# Patient Record
Sex: MALE | Race: WHITE | NOT HISPANIC OR LATINO | Employment: FULL TIME | URBAN - METROPOLITAN AREA
[De-identification: names, ages, dates, MRNs, and addresses within clinical notes are randomized per-mention and may not be internally consistent; named-entity substitution may affect disease eponyms.]

---

## 2017-03-29 ENCOUNTER — APPOINTMENT (OUTPATIENT)
Dept: LAB | Facility: MEDICAL CENTER | Age: 42
End: 2017-03-29

## 2017-03-29 ENCOUNTER — APPOINTMENT (OUTPATIENT)
Dept: URGENT CARE | Facility: MEDICAL CENTER | Age: 42
End: 2017-03-29

## 2017-03-29 ENCOUNTER — TRANSCRIBE ORDERS (OUTPATIENT)
Dept: URGENT CARE | Facility: MEDICAL CENTER | Age: 42
End: 2017-03-29

## 2017-03-29 DIAGNOSIS — Z02.1 PHYSICAL EXAM, PRE-EMPLOYMENT: Primary | ICD-10-CM

## 2017-03-29 DIAGNOSIS — Z02.1 PHYSICAL EXAM, PRE-EMPLOYMENT: ICD-10-CM

## 2017-03-29 PROCEDURE — 36415 COLL VENOUS BLD VENIPUNCTURE: CPT

## 2017-03-29 PROCEDURE — 86480 TB TEST CELL IMMUN MEASURE: CPT

## 2017-03-31 LAB
ANNOTATION COMMENT IMP: NORMAL
GAMMA INTERFERON BACKGROUND BLD IA-ACNC: 0.04 IU/ML
M TB IFN-G BLD-IMP: NEGATIVE
M TB IFN-G CD4+ BCKGRND COR BLD-ACNC: 0.07 IU/ML
M TB IFN-G CD4+ T-CELLS BLD-ACNC: 0.11 IU/ML
MITOGEN IGNF BLD-ACNC: 9.85 IU/ML
QUANTIFERON-TB GOLD IN TUBE: NORMAL
SERVICE CMNT-IMP: NORMAL

## 2022-10-11 ENCOUNTER — TRANSCRIBE ORDERS (OUTPATIENT)
Dept: SCHEDULING | Age: 47
End: 2022-10-11

## 2022-10-11 DIAGNOSIS — R31.29 OTHER MICROSCOPIC HEMATURIA: ICD-10-CM

## 2022-10-11 DIAGNOSIS — N20.0 CALCULUS OF KIDNEY: Primary | ICD-10-CM

## 2022-10-21 ENCOUNTER — HOSPITAL ENCOUNTER (OUTPATIENT)
Dept: RADIOLOGY | Age: 47
Discharge: HOME | End: 2022-10-21
Attending: UROLOGY
Payer: COMMERCIAL

## 2022-10-21 DIAGNOSIS — N20.0 CALCULUS OF KIDNEY: ICD-10-CM

## 2022-10-21 DIAGNOSIS — R31.29 OTHER MICROSCOPIC HEMATURIA: ICD-10-CM

## 2022-10-21 PROCEDURE — 76770 US EXAM ABDO BACK WALL COMP: CPT

## 2024-10-17 ENCOUNTER — TELEPHONE (OUTPATIENT)
Dept: SCHEDULING | Facility: CLINIC | Age: 49
End: 2024-10-17
Payer: COMMERCIAL

## 2024-10-17 NOTE — TELEPHONE ENCOUNTER
New Patient Appointment Request    Name of caller: cullen    Reason for Visit: CT score results    Insurance: pt changed plans needs to call back to provide new info    Insurance ID #:     Recent Cardiac Test/Procedures: CT calcium score     Referred by: PCP    Primary Care Physician: Dr Anthony    Previous Cardiologist name and phone number: none    Best contact number: [phone]     Additional notes/History:

## 2024-10-17 NOTE — TELEPHONE ENCOUNTER
Dr. Anthony referrals are generally for my [place] office as they are closer to there. Liz, nonurgent outpatient consult, fine for it to me next available in January or February.

## 2024-11-11 ENCOUNTER — OFFICE VISIT (OUTPATIENT)
Dept: CARDIOLOGY | Facility: CLINIC | Age: 49
End: 2024-11-11
Payer: COMMERCIAL

## 2024-11-11 VITALS
WEIGHT: 179 LBS | HEART RATE: 74 BPM | DIASTOLIC BLOOD PRESSURE: 80 MMHG | SYSTOLIC BLOOD PRESSURE: 130 MMHG | BODY MASS INDEX: 27.13 KG/M2 | OXYGEN SATURATION: 99 % | HEIGHT: 68 IN

## 2024-11-11 DIAGNOSIS — R93.1 ELEVATED CORONARY ARTERY CALCIUM SCORE: Primary | ICD-10-CM

## 2024-11-11 DIAGNOSIS — R03.0 ELEVATED BLOOD-PRESSURE READING WITHOUT DIAGNOSIS OF HYPERTENSION: ICD-10-CM

## 2024-11-11 LAB
ATRIAL RATE: 73
P AXIS: -6
PR INTERVAL: 146
QRS DURATION: 78
QT INTERVAL: 362
QTC CALCULATION(BAZETT): 398
R AXIS: 49
T WAVE AXIS: 29
VENTRICULAR RATE: 73

## 2024-11-11 PROCEDURE — 3008F BODY MASS INDEX DOCD: CPT | Performed by: STUDENT IN AN ORGANIZED HEALTH CARE EDUCATION/TRAINING PROGRAM

## 2024-11-11 PROCEDURE — 99214 OFFICE O/P EST MOD 30 MIN: CPT | Performed by: STUDENT IN AN ORGANIZED HEALTH CARE EDUCATION/TRAINING PROGRAM

## 2024-11-11 PROCEDURE — 93000 ELECTROCARDIOGRAM COMPLETE: CPT | Performed by: STUDENT IN AN ORGANIZED HEALTH CARE EDUCATION/TRAINING PROGRAM

## 2024-11-11 RX ORDER — ATENOLOL 25 MG/1
25 TABLET ORAL DAILY
COMMUNITY

## 2024-11-11 RX ORDER — ASPIRIN 81 MG/1
81 TABLET ORAL DAILY
COMMUNITY

## 2024-11-11 RX ORDER — LATANOPROST 50 UG/ML
1 SOLUTION/ DROPS OPHTHALMIC NIGHTLY
COMMUNITY
Start: 2024-09-30

## 2024-11-11 RX ORDER — ALPRAZOLAM 0.25 MG/1
0.25 TABLET ORAL 2 TIMES DAILY
COMMUNITY
Start: 2024-10-22

## 2024-11-11 RX ORDER — ACETAMINOPHEN 500 MG
125 TABLET ORAL
COMMUNITY

## 2024-11-11 RX ORDER — MELOXICAM 7.5 MG/1
7.5 TABLET ORAL AS NEEDED
COMMUNITY
Start: 2024-09-12

## 2024-11-11 RX ORDER — ROSUVASTATIN CALCIUM 10 MG/1
10 TABLET, COATED ORAL DAILY
COMMUNITY
Start: 2024-10-18

## 2024-11-11 RX ORDER — SILDENAFIL CITRATE 20 MG/1
20 TABLET ORAL AS NEEDED
COMMUNITY
Start: 2024-11-07

## 2024-11-11 RX ORDER — MV/FA/DHA/EPA/FISH OIL/SAW/GNK 400MCG-200
500 COMBINATION PACKAGE (EA) ORAL DAILY
COMMUNITY

## 2024-11-11 ASSESSMENT — ENCOUNTER SYMPTOMS
AGITATION: 0
HALLUCINATIONS: 0
STRIDOR: 0
FATIGUE: 0
ABDOMINAL PAIN: 0
DIAPHORESIS: 0
SHORTNESS OF BREATH: 0
DYSURIA: 0
PALPITATIONS: 0
JOINT SWELLING: 0
LIGHT-HEADEDNESS: 0
MYALGIAS: 0
COLOR CHANGE: 0
VOMITING: 0
EYE DISCHARGE: 0
HEMATURIA: 0
EYE REDNESS: 0
FACIAL SWELLING: 0

## 2024-11-11 NOTE — PROGRESS NOTES
Cm Sullivan MD, Located within [facility]  Non-invasive Cardiology    [facility]  [facility]  [address]  [address]  [place] [address]    [facility] [place]  [address] [place] [address]     [phone]    [facility].[employer]     2024        Patient Name: Evelio Carlos  Account:          [id number]  :               1975        Dear Minnie Anthony MD:     I had the pleasure of seeing your patient, Evelio Carlos, on 2024. As you know, he is a 48 y.o. male with medical history as described below.     HPI  48 y.o. male with an elevated CAC (25) who presents for cardiovascular evaluation.  At today's visit the patient reports feeling well.   He states that his PCP recently ordered a coronary calcium score for him and was noted to be elevated and he was subsequently referred to cardiology for further evaluation.  In discussion with the patient he denies any history of chest pains, shortness of breath, or other anginal equivalents either at rest or with exertion.  He reports exercising regularly, going to the gym and doing both aerobic exercise as well as weight lifting.  He feels well doing so and never has any cardiovascular symptomatology.  He reports a prior history of tobacco use, quit about 10 years ago, and smoked for about 20 years, approximately half a pack a day.  He denies any history of heavy alcohol use or illicit drug use.  He reports a family history of heart failure in his father.      CARDIOVASCULAR STUDIES:    ECG: Independently reviewed: Normal sinus rhythm, normal ECG     CORONARY CALCIUM SCAN:    CT Calcium Score 10/15/2024  CORONARY CALCIUM SCORE:     Vessel    Number of                 Calcium volume     Calcium Score                    Calcifications                                             ([place])   =======================================================   LM:          0                  0                 0   LAD:       1 14 25   LCX:       0                 0                0   RCA:       0                  0                0   =======================================================   Total:        1 14            25          History reviewed. No pertinent past medical history.     Past Surgical History   Procedure Laterality Date    Wrist surgery Bilateral         Family History   Problem Relation Name Age of Onset    Rheum arthritis Biological Mother      Osteoporosis Biological Mother      Heart disease Biological Father      Diabetes Biological Father      Heart failure Biological Father      No Known Problems Maternal Grandmother      No Known Problems Maternal Grandfather      Dementia Paternal Grandmother      Heart attack Paternal Grandfather      Heart disease Paternal Grandfather      Diabetes Paternal Grandfather          Social History     Socioeconomic History    Marital status:      Spouse name: None    Number of children: None    Years of education: None    Highest education level: None   Tobacco Use    Smoking status: Former     Types: Cigarettes     Start date: 2012    Smokeless tobacco: Never   Vaping Use    Vaping status: Never Used   Substance and Sexual Activity    Alcohol use: Yes     Alcohol/week: 4.0 - 5.0 standard drinks of alcohol     Types: 4 - 5 Standard drinks or equivalent per week     Comment: social    Drug use: Never    Sexual activity: Defer        Current Outpatient Medications   Medication Sig Dispense Refill    ALPRAZolam (XANAX) 0.25 mg tablet Take 0.25 mg by mouth 2 (two) times a day.      aspirin 81 mg enteric coated tablet Take 81 mg by mouth daily.      atenoloL (TENORMIN) 25 mg tablet Take 25 mg by mouth daily.      cholecalciferol, vitamin D3, 5,000 unit (125 mcg) tablet Take 125 mcg by mouth 4 (four) times a week (Mon, Wed, Fri, Sat).      krill oil 500 mg capsule Take 500 mg by mouth daily.       "latanoprost (XALATAN) 0.005 % ophthalmic solution Administer 1 drop into both eyes nightly.      meloxicam (MOBIC) 7.5 mg tablet Take 7.5 mg by mouth as needed.      multivit-min/ferrous fumarate (MULTI VITAMIN ORAL) Take 1 tablet by mouth daily.      rosuvastatin (CRESTOR) 10 mg tablet Take 10 mg by mouth daily.      saw/Pygeum/beta/herb/D3/B6/Zn (PROSTATE HEALTH MEN'S ORAL) Take 1 tablet by mouth daily.      sildenafiL, pulm.hypertension, (REVATIO) 20 mg tablet Take 20 mg by mouth as needed.       No current facility-administered medications for this visit.        Allergies:  Patient has no known allergies.       Review of Systems   Constitutional:  Negative for diaphoresis and fatigue.   HENT:  Negative for facial swelling and nosebleeds.    Eyes:  Negative for discharge and redness.   Respiratory:  Negative for shortness of breath and stridor.    Cardiovascular:  Negative for chest pain and palpitations.   Gastrointestinal:  Negative for abdominal pain and vomiting.   Genitourinary:  Negative for dysuria and hematuria.   Musculoskeletal:  Negative for joint swelling and myalgias.   Skin:  Negative for color change and rash.   Neurological:  Negative for syncope and light-headedness.   Psychiatric/Behavioral:  Negative for agitation and hallucinations.         Vitals:    11/11/24 0911 11/11/24 0920   BP: (!) 136/94 (!) 140/94   BP Location: Left upper arm Right upper arm   Patient Position: Sitting Sitting   Pulse: 74    SpO2: 99%    Weight: 81.2 kg (179 lb)    Height: 1.727 m (5' 8\")      Body mass index is 27.22 kg/m².     Physical Exam  Constitutional:       General: He is not in acute distress.     Appearance: Normal appearance. He is not ill-appearing.   HENT:      Head: Normocephalic and atraumatic.      Nose: Nose normal.   Eyes:      General:         Right eye: No discharge.         Left eye: No discharge.      Conjunctiva/sclera: Conjunctivae normal.   Cardiovascular:      Rate and Rhythm: Normal rate " and regular rhythm.      Heart sounds: No murmur heard.     No friction rub. No gallop.   Pulmonary:      Effort: No respiratory distress.      Breath sounds: No stridor. No wheezing, rhonchi or rales.   Abdominal:      General: There is no distension.      Tenderness: There is no guarding.   Musculoskeletal:      Right lower leg: No edema.      Left lower leg: No edema.   Skin:     General: Skin is warm and dry.   Neurological:      Mental Status: He is alert. Mental status is at baseline.   Psychiatric:         Mood and Affect: Mood normal.         Behavior: Behavior normal.             Diagnosis Plan   1. Elevated coronary artery calcium score  [facility] ECG 12 lead (clinic performed)    Transthoracic echo (TTE) complete      2. Elevated blood-pressure reading without diagnosis of hypertension            ASSESSMENT AND PLAN:     48 y.o. male with an elevated CAC (25) who presents for cardiovascular evaluation.      # Coronary artery calcifications (CAC 25)  Patient is currently asymptomatic.  He exercises regularly with relatively heavy workouts and denies any history of chest pains, shortness of breath, or other anginal equivalents.  Patient was recently started on rosuvastatin 10 mg daily along with aspirin 81 mg daily.  I explained that given the calcium score below 100, it is acceptable for him to not take the aspirin, though with a calcium score greater than 100 then it is more strongly recommended.  He will think about what he would like to do and make a final decision and let us know.  Continue rosuvastatin 10 mg daily.  Repeat lipid panel in approximately 3 to 6 months.  Check echocardiogram.     # Elevated blood pressure  Blood pressure elevated today's visit, though patient states blood pressures at home are typically in the 110s and 120s over 70s millimeters of mercury.  Suspect some degree of anxiety/whitecoat hypertension.            Return in about 6 months (around 5/11/2025).     I thank you  for the opportunity to take part in the care of Evelio Carlos.  Please do not hesitate to contact me with any questions or concerns.     Sincerely,  Cm Sullivan MD  Cardiovascular Disease  11/11/2024      This note was generated using speech recognition software. Please excuse any typographical errors. Please contact me with any questions or concerns.

## 2024-11-12 ENCOUNTER — TELEPHONE (OUTPATIENT)
Dept: SCHEDULING | Facility: CLINIC | Age: 49
End: 2024-11-12
Payer: COMMERCIAL

## 2024-11-12 NOTE — TELEPHONE ENCOUNTER
Called and spoke with Evelio. Nervous about his upcoming Echo.   Explained what an echo is and what it looks at.  He understands.  Emotional support given.  No questions.

## 2024-11-12 NOTE — TELEPHONE ENCOUNTER
Patient Name: Evelio Carlos    Caller name: Evelio Carlos      Relationship: self    Reason for call: Pt called stated he was in to see Dr Sullivan in 11/11/24. Was asked about symptoms.   Pt was not experiencing any symptoms at the time.     Pt thinks he may have had some SOB and leg numbing while working out . Wants to discuss what will be seen on Echo scheduled for 11/20/24    Callback number: [phone]

## 2024-11-20 ENCOUNTER — HOSPITAL ENCOUNTER (OUTPATIENT)
Dept: CARDIOLOGY | Facility: CLINIC | Age: 49
Discharge: HOME | End: 2024-11-20
Attending: STUDENT IN AN ORGANIZED HEALTH CARE EDUCATION/TRAINING PROGRAM
Payer: COMMERCIAL

## 2024-11-20 VITALS
BODY MASS INDEX: 26.07 KG/M2 | HEIGHT: 68 IN | DIASTOLIC BLOOD PRESSURE: 80 MMHG | SYSTOLIC BLOOD PRESSURE: 110 MMHG | WEIGHT: 172 LBS

## 2024-11-20 DIAGNOSIS — R93.1 ELEVATED CORONARY ARTERY CALCIUM SCORE: ICD-10-CM

## 2024-11-20 LAB
AORTIC ROOT ANNULUS: 3.1 CM
ASCENDING AORTA: 3.5 CM
AV PEAK GRADIENT: 13 MMHG
AV PEAK VELOCITY-S: 1.79 M/S
AV VALVE AREA: 1.54 CM2
BSA FOR ECHO PROCEDURE: 1.93 M2
DOP CALC LVOT STROKE VOLUME: 76.62 CM3
DOP CALC RVOT AREA: 8.55 CM2
DOP CALC RVOT STROKE VOLUME: 166.7 CM3
EDV (BP): 109 CM3
EF (A4C): 71.4 %
EF A2C: 74.3 %
EJECTION FRACTION: 73.4 %
EST RIGHT VENT SYSTOLIC PRESSURE BY TRICUSPID REGURGITATION JET: 35 MMHG
ESV (BP): 29 CM3
FRACTIONAL SHORTENING: 38.7 %
INTERVENTRICULAR SEPTUM: 1.01 CM
LA ESV (BP): 87.2 CM3
LA ESV INDEX (A2C): 38.03 CM3/M2
LA ESV INDEX (BP): 45.18 CM3/M2
LA/AORTA RATIO: 1.39
LAAS-AP2: 23.7 CM2
LAAS-AP4: 28 CM2
LAD 2D: 4.3 CM
LALD A4C: 6.09 CM
LALD A4C: 6.9 CM
LAV-S: 73.4 CM3
LEFT ATRIUM VOLUME INDEX: 47.51 CM3/M2
LEFT ATRIUM VOLUME: 91.7 CM3
LEFT INTERNAL DIMENSION IN SYSTOLE: 2.82 CM (ref 2.71–4.11)
LEFT VENTRICLE DIASTOLIC VOLUME INDEX: 51.4 CM3/M2
LEFT VENTRICLE DIASTOLIC VOLUME: 99.2 CM3
LEFT VENTRICLE SYSTOLIC VOLUME INDEX: 14.77 CM3/M2
LEFT VENTRICLE SYSTOLIC VOLUME: 28.5 CM3
LEFT VENTRICULAR INTERNAL DIMENSION IN DIASTOLE: 4.6 CM (ref 4.6–6.39)
LEFT VENTRICULAR POSTERIOR WALL IN END DIASTOLE: 1.13 CM (ref 0.6–1.12)
LV DIASTOLIC VOLUME: 112 CM3
LV ESV (APICAL 2 CHAMBER): 28.8 CM3
LVAD-AP2: 34.2 CM2
LVAD-AP4: 31.3 CM2
LVAS-AP2: 14.8 CM2
LVAS-AP4: 14.4 CM2
LVEDVI(A2C): 58.03 CM3/M2
LVEDVI(BP): 56.48 CM3/M2
LVESVI(A2C): 14.92 CM3/M2
LVESVI(BP): 15.03 CM3/M2
LVLD-AP2: 8.42 CM
LVLD-AP4: 7.83 CM
LVLS-AP2: 6.19 CM
LVLS-AP4: 6.02 CM
LVOT 2D: 2 CM
LVOT A: 3.14 CM2
LVOT MG: 3 MMHG
LVOT MV: 0.8 M/S
LVOT PEAK VELOCITY: 1.12 M/S
LVOT PG: 5 MMHG
LVOT STROKE VOLUME INDEX: 39.7 ML/M2
LVOT VTI: 24.4 CM
MLH CV ECHO AVA INDEX VELOCITY RATIO: 0.8
MV E'TISSUE VEL-LAT: 0.11 M/S
MV E'TISSUE VEL-MED: 0.07 M/S
POSTERIOR WALL: 1.13 CM
PULMONARY REGURGITATION LATE DIASTOLIC VELOCITY: 1.23 M/S
PV PEAK GRADIENT: 8 MMHG
PV PV: 1.38 M/S
PV VALVE AREA: 5.54 CM2
RAP: 5 MMHG
RVOT D: 3.3 CM
RVOT VMAX: 0.89 M/S
RVOT VTI: 19.5 CM
SEPTAL TISSUE DOPPLER FREE WALL LATE DIA VELOCITY (APICAL 4 CHAMBER VIEW): 0.16 M/S
TR MAX PG: 30.47 MMHG
TRICUSPID VALVE PEAK REGURGITATION VELOCITY: 2.76 M/S
Z-SCORE OF LEFT VENTRICULAR DIMENSION IN END DIASTOLE: -1.64
Z-SCORE OF LEFT VENTRICULAR DIMENSION IN END SYSTOLE: -1.33
Z-SCORE OF LEFT VENTRICULAR POSTERIOR WALL IN END DIASTOLE: 1.68

## 2024-11-20 PROCEDURE — 93306 TTE W/DOPPLER COMPLETE: CPT | Performed by: STUDENT IN AN ORGANIZED HEALTH CARE EDUCATION/TRAINING PROGRAM

## 2024-11-21 DIAGNOSIS — I51.7 ATRIAL DILATION: Primary | ICD-10-CM

## 2024-11-21 NOTE — TELEPHONE ENCOUNTER
Called and spoke with Evelio.  Reviewed echo results of no significant abnormalities.  You do have mild to moderate leakiness of pulmonic valve and borderline dilated left atrium.  Patient has no symptoms.  Dr. Sullivan recommends repeat echo in 6 months.  He already has an appointment on 5/12/25.  We will try and schedule echo on same day.  Some one from office will contact you once echo scheduled.  He understands.  No questions.

## 2024-11-21 NOTE — TELEPHONE ENCOUNTER
Pt called to clarify which symptoms he should be watching out for. Pt can be reached at [phone]

## 2024-11-21 NOTE — TELEPHONE ENCOUNTER
Called and spoke with Evelio.  Reviewed symptoms he should watch out or.  He understands.  No questions.

## 2025-05-20 ENCOUNTER — OFFICE VISIT (OUTPATIENT)
Dept: CARDIOLOGY | Facility: CLINIC | Age: 50
End: 2025-05-20
Payer: COMMERCIAL

## 2025-05-20 ENCOUNTER — HOSPITAL ENCOUNTER (OUTPATIENT)
Dept: CARDIOLOGY | Facility: CLINIC | Age: 50
Discharge: HOME | End: 2025-05-20
Attending: STUDENT IN AN ORGANIZED HEALTH CARE EDUCATION/TRAINING PROGRAM
Payer: COMMERCIAL

## 2025-05-20 VITALS
SYSTOLIC BLOOD PRESSURE: 125 MMHG | HEIGHT: 68 IN | BODY MASS INDEX: 25.76 KG/M2 | DIASTOLIC BLOOD PRESSURE: 78 MMHG | WEIGHT: 170 LBS

## 2025-05-20 VITALS
HEIGHT: 68 IN | WEIGHT: 170 LBS | OXYGEN SATURATION: 98 % | SYSTOLIC BLOOD PRESSURE: 128 MMHG | BODY MASS INDEX: 25.76 KG/M2 | DIASTOLIC BLOOD PRESSURE: 84 MMHG | HEART RATE: 77 BPM

## 2025-05-20 DIAGNOSIS — I51.7 ATRIAL DILATION: Primary | ICD-10-CM

## 2025-05-20 DIAGNOSIS — R03.0 ELEVATED BLOOD-PRESSURE READING WITHOUT DIAGNOSIS OF HYPERTENSION: ICD-10-CM

## 2025-05-20 DIAGNOSIS — R93.1 ELEVATED CORONARY ARTERY CALCIUM SCORE: ICD-10-CM

## 2025-05-20 DIAGNOSIS — I37.1 PULMONARY VALVE INSUFFICIENCY, UNSPECIFIED ETIOLOGY: ICD-10-CM

## 2025-05-20 DIAGNOSIS — I51.7 ATRIAL DILATION: ICD-10-CM

## 2025-05-20 LAB
AORTIC ROOT ANNULUS: 3.3 CM
ASCENDING AORTA: 3.4 CM
ATRIAL RATE: 69
AV PEAK GRADIENT: 13 MMHG
AV PEAK VELOCITY-S: 1.79 M/S
AV VALVE AREA: 1.98 CM2
BSA FOR ECHO PROCEDURE: 1.92 M2
DOP CALC LVOT STROKE VOLUME: 93.89 CM3
E WAVE DECELERATION TIME: 293 MS
E/A RATIO: 1
E/E' RATIO: 13.8
E/LAT E' RATIO: 8.5
EDV (BP): 127 CM3
EF (A4C): 61.3 %
EF A2C: 66.9 %
EJECTION FRACTION: 64.3 %
EST RIGHT VENT SYSTOLIC PRESSURE BY TRICUSPID REGURGITATION JET: 33 MMHG
ESV (BP): 45.3 CM3
FRACTIONAL SHORTENING: 29.07 %
INTERVENTRICULAR SEPTUM: 1.12 CM
LA ESV (BP): 85.7 CM3
LA ESV INDEX (A2C): 46.77 CM3/M2
LA ESV INDEX (BP): 44.64 CM3/M2
LA/AORTA RATIO: 1.36
LAAS-AP2: 27 CM2
LAAS-AP4: 25.7 CM2
LAD 2D: 4.5 CM
LAL MED-LAT (A4C): 7 CM
LAV-S: 89.8 CM3
LEFT ATRIAL LENGTH SUPERIOR-INFERIOR (APICAL 2-CHAMBER VIEW): 6.6 CM
LEFT ATRIUM VOLUME INDEX: 40.31 CM3/M2
LEFT ATRIUM VOLUME: 77.4 CM3
LEFT INTERNAL DIMENSION IN SYSTOLE: 3.27 CM (ref 2.69–4.07)
LEFT VENTRICLE DIASTOLIC VOLUME INDEX: 61.46 CM3/M2
LEFT VENTRICLE DIASTOLIC VOLUME: 118 CM3
LEFT VENTRICLE SYSTOLIC VOLUME INDEX: 23.8 CM3/M2
LEFT VENTRICLE SYSTOLIC VOLUME: 45.7 CM3
LEFT VENTRICULAR INTERNAL DIMENSION IN DIASTOLE: 4.61 CM (ref 4.56–6.33)
LEFT VENTRICULAR POSTERIOR WALL IN END DIASTOLE: 1.15 CM (ref 0.6–1.11)
LV DIASTOLIC VOLUME: 131 CM3
LV ESV (APICAL 2 CHAMBER): 43.3 CM3
LVAD-AP2: 36.4 CM2
LVAD-AP4: 34.3 CM2
LVAS-AP2: 18.9 CM2
LVAS-AP4: 19.2 CM2
LVEDVI(A2C): 68.23 CM3/M2
LVEDVI(BP): 66.15 CM3/M2
LVESVI(A2C): 22.55 CM3/M2
LVESVI(BP): 23.59 CM3/M2
LVLD-AP2: 8.29 CM
LVLD-AP4: 7.96 CM
LVLS-AP2: 6.78 CM
LVLS-AP4: 6.52 CM
LVOT 2D: 2 CM
LVOT A: 3.14 CM2
LVOT MG: 4 MMHG
LVOT MV: 0.99 M/S
LVOT PEAK VELOCITY: 1.44 M/S
LVOT PG: 8 MMHG
LVOT STROKE VOLUME INDEX: 48.9 ML/M2
LVOT VTI: 29.9 CM
MLH CV ECHO AVA INDEX VELOCITY RATIO: 1
MV E'TISSUE VEL-LAT: 0.12 M/S
MV E'TISSUE VEL-MED: 0.07 M/S
MV PEAK A VEL: 1.01 M/S
MV PEAK E VEL: 1.01 M/S
MV STENOSIS PRESSURE HALF TIME: 86 MS
MV VALVE AREA P 1/2 METHOD: 2.56 CM2
P AXIS: 10
POSTERIOR WALL: 1.15 CM
PR INTERVAL: 164
PULMONARY REGURGITATION LATE DIASTOLIC VELOCITY: 1.21 M/S
PV PEAK GRADIENT: 8 MMHG
PV PV: 1.4 M/S
QRS DURATION: 88
QT INTERVAL: 366
QTC CALCULATION(BAZETT): 392
R AXIS: 60
RAP: 8 MMHG
RVOT VMAX: 0.9 M/S
RVOT VTI: 20.1 CM
SEPTAL TISSUE DOPPLER FREE WALL LATE DIA VELOCITY (APICAL 4 CHAMBER VIEW): 0.15 M/S
T WAVE AXIS: 41
TR MAX PG: 24.8 MMHG
TRICUSPID VALVE PEAK REGURGITATION VELOCITY: 2.49 M/S
VENTRICULAR RATE: 69
Z-SCORE OF LEFT VENTRICULAR DIMENSION IN END DIASTOLE: -1.53
Z-SCORE OF LEFT VENTRICULAR DIMENSION IN END SYSTOLE: -0.1
Z-SCORE OF LEFT VENTRICULAR POSTERIOR WALL IN END DIASTOLE: 1.8

## 2025-05-20 PROCEDURE — 93000 ELECTROCARDIOGRAM COMPLETE: CPT | Performed by: STUDENT IN AN ORGANIZED HEALTH CARE EDUCATION/TRAINING PROGRAM

## 2025-05-20 PROCEDURE — 3008F BODY MASS INDEX DOCD: CPT | Performed by: STUDENT IN AN ORGANIZED HEALTH CARE EDUCATION/TRAINING PROGRAM

## 2025-05-20 PROCEDURE — 99214 OFFICE O/P EST MOD 30 MIN: CPT | Performed by: STUDENT IN AN ORGANIZED HEALTH CARE EDUCATION/TRAINING PROGRAM

## 2025-05-20 PROCEDURE — 93306 TTE W/DOPPLER COMPLETE: CPT | Performed by: STUDENT IN AN ORGANIZED HEALTH CARE EDUCATION/TRAINING PROGRAM

## 2025-05-20 RX ORDER — TADALAFIL 5 MG/1
TABLET ORAL
COMMUNITY
Start: 2025-03-03